# Patient Record
Sex: MALE | Race: WHITE | NOT HISPANIC OR LATINO | Employment: OTHER | URBAN - METROPOLITAN AREA
[De-identification: names, ages, dates, MRNs, and addresses within clinical notes are randomized per-mention and may not be internally consistent; named-entity substitution may affect disease eponyms.]

---

## 2019-09-12 ENCOUNTER — PATIENT OUTREACH (OUTPATIENT)
Dept: HEALTH INFORMATION MANAGEMENT | Facility: OTHER | Age: 84
End: 2019-09-12

## 2025-04-26 PROBLEM — L40.8 OTHER PSORIASIS: Status: ACTIVE | Noted: 2025-04-26

## 2025-04-26 PROBLEM — Z86.79 HISTORY OF WOLFF-PARKINSON-WHITE SYNDROME: Status: ACTIVE | Noted: 2025-04-26

## 2025-04-26 PROBLEM — R73.09 OTHER ABNORMAL GLUCOSE: Status: ACTIVE | Noted: 2025-04-26

## 2025-04-26 PROBLEM — D49.1 NEOPLASM OF RESPIRATORY SYSTEM: Status: ACTIVE | Noted: 2025-04-26

## 2025-04-26 PROBLEM — A60.00 HERPES GENITALIS: Status: ACTIVE | Noted: 2025-04-26

## 2025-04-26 PROBLEM — I45.6 ANOMALOUS ATRIOVENTRICULAR EXCITATION: Status: ACTIVE | Noted: 2025-04-26

## 2025-04-26 PROBLEM — I65.23 BILATERAL CAROTID ARTERY STENOSIS: Status: ACTIVE | Noted: 2025-04-26

## 2025-04-26 PROBLEM — K21.9 GASTROESOPHAGEAL REFLUX DISEASE: Status: ACTIVE | Noted: 2025-04-26

## 2025-04-26 PROBLEM — M48.061 SPINAL STENOSIS OF LUMBAR REGION: Status: ACTIVE | Noted: 2025-04-26

## 2025-04-26 PROBLEM — R91.1 SOLITARY PULMONARY NODULE: Status: ACTIVE | Noted: 2025-04-26

## 2025-04-26 PROBLEM — I25.9 CHRONIC ISCHEMIC HEART DISEASE: Status: ACTIVE | Noted: 2025-04-26

## 2025-04-26 PROBLEM — I35.0 NONRHEUMATIC AORTIC (VALVE) STENOSIS: Status: ACTIVE | Noted: 2025-04-26

## 2025-04-26 PROBLEM — I77.9 PERIPHERAL ARTERIAL OCCLUSIVE DISEASE (HCC): Status: ACTIVE | Noted: 2025-04-26

## 2025-04-26 PROBLEM — I10 BENIGN HYPERTENSION: Status: ACTIVE | Noted: 2025-04-26

## 2025-04-26 PROBLEM — I35.0 AORTIC VALVE STENOSIS: Status: ACTIVE | Noted: 2025-04-26

## 2025-04-26 PROBLEM — I50.9 CHRONIC CONGESTIVE HEART FAILURE (HCC): Status: ACTIVE | Noted: 2025-04-26

## 2025-04-26 PROBLEM — R00.1 SYMPTOMATIC BRADYCARDIA: Status: ACTIVE | Noted: 2025-04-26

## 2025-04-26 PROBLEM — I48.91 A-FIB (HCC): Status: ACTIVE | Noted: 2025-04-26

## 2025-04-26 PROBLEM — I44.7 LEFT BUNDLE BRANCH BLOCK: Status: ACTIVE | Noted: 2025-04-26

## 2025-04-26 PROBLEM — I44.1 ATRIOVENTRICULAR BLOCK, SECOND DEGREE: Status: ACTIVE | Noted: 2025-04-26

## 2025-04-26 PROBLEM — I49.3 MULTIFOCAL PVCS: Status: ACTIVE | Noted: 2025-04-26

## 2025-04-26 PROBLEM — I73.9 PERIPHERAL VASCULAR DISEASE, UNSPECIFIED (HCC): Status: ACTIVE | Noted: 2025-04-26

## 2025-04-26 PROBLEM — M54.50 LOW BACK PAIN, UNSPECIFIED: Status: ACTIVE | Noted: 2025-04-26

## 2025-04-26 PROBLEM — Z95.5 PRESENCE OF DRUG COATED STENT IN ANTERIOR DESCENDING BRANCH OF LEFT CORONARY ARTERY: Status: ACTIVE | Noted: 2025-04-26

## 2025-04-26 PROBLEM — R54 AGE-RELATED PHYSICAL DEBILITY: Status: ACTIVE | Noted: 2025-04-26

## 2025-05-02 ENCOUNTER — DOCUMENTATION (OUTPATIENT)
Dept: CARDIOLOGY | Facility: MEDICAL CENTER | Age: OVER 89
End: 2025-05-02

## 2025-05-02 NOTE — PROGRESS NOTES
This patient has been flagged through our echocardiogram surveillance with the following echocardiogram results:    Moderate to Severe Aortic Stenosis    Surveillance letter electronically faxed to PCP Dr. Lavon Moreira [phone]